# Patient Record
Sex: FEMALE | HISPANIC OR LATINO | URBAN - METROPOLITAN AREA
[De-identification: names, ages, dates, MRNs, and addresses within clinical notes are randomized per-mention and may not be internally consistent; named-entity substitution may affect disease eponyms.]

---
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
## 2024-12-17 ENCOUNTER — OFFICE VISIT (OUTPATIENT)
Dept: URGENT CARE | Facility: CLINIC | Age: 19
End: 2024-12-17

## 2024-12-17 VITALS
BODY MASS INDEX: 38.01 KG/M2 | OXYGEN SATURATION: 97 % | HEART RATE: 120 BPM | TEMPERATURE: 100.3 F | DIASTOLIC BLOOD PRESSURE: 88 MMHG | RESPIRATION RATE: 16 BRPM | SYSTOLIC BLOOD PRESSURE: 128 MMHG | WEIGHT: 250.8 LBS | HEIGHT: 68 IN

## 2024-12-17 DIAGNOSIS — B34.9 VIRAL ILLNESS: ICD-10-CM

## 2024-12-17 DIAGNOSIS — R00.0 TACHYCARDIA: ICD-10-CM

## 2024-12-17 PROCEDURE — 3074F SYST BP LT 130 MM HG: CPT | Performed by: FAMILY MEDICINE

## 2024-12-17 PROCEDURE — 99204 OFFICE O/P NEW MOD 45 MIN: CPT | Performed by: FAMILY MEDICINE

## 2024-12-17 PROCEDURE — 3079F DIAST BP 80-89 MM HG: CPT | Performed by: FAMILY MEDICINE

## 2024-12-17 NOTE — LETTER
December 17, 2024    To Whom It May Concern:         This is confirmation that Evelina Holden attended her scheduled appointment with Janina Haines M.D. on 12/17/24. She may return to work when she's had no fever for greater than 24 hours without the help of medication.         If you have any questions please do not hesitate to call me at the phone number listed below.    Sincerely,          Janina Haines M.D.  [phone]

## 2024-12-18 NOTE — PROGRESS NOTES
"  Subjective:      18 y.o. female presents to urgent care for cold symptoms that started on Sunday. She is experiencing fever, headache, body aches, sore throat, and cough. No diarrhea.  Heart rate is elevated today in urgent care.  She denies any chest pain, palpitations, or shortness of breath.  No tobacco product use. No history of asthma or COPD. She is not vaccinated against COVID. No known sick contacts.     She denies any other questions or concerns at this time.    Current problem list, medication, and past medical/surgical history were reviewed in Epic.    ROS  See HPI     Objective:      /88 (BP Location: Left arm, Patient Position: Sitting, BP Cuff Size: Large adult)   Pulse (!) 120   Temp 37.9 °C (100.3 °F)   Resp 16   Ht 1.727 m (5' 8\")   Wt 114 kg (250 lb 12.8 oz)   SpO2 97%   BMI 38.13 kg/m²     Physical Exam  Constitutional:       General: She is not in acute distress.     Appearance: She is not diaphoretic.   HENT:      Right Ear: Tympanic membrane, ear canal and external ear normal.      Left Ear: Tympanic membrane, ear canal and external ear normal.      Mouth/Throat:      Tongue: Tongue does not deviate from midline.      Palate: No lesions.      Pharynx: Uvula midline. No oropharyngeal exudate or posterior oropharyngeal erythema.      Tonsils: No tonsillar exudate. 1+ on the right. 1+ on the left.   Cardiovascular:      Rate and Rhythm: Regular rhythm. Tachycardia present.      Heart sounds: Normal heart sounds.   Pulmonary:      Effort: Pulmonary effort is normal. No respiratory distress.      Breath sounds: Normal breath sounds.   Neurological:      Mental Status: She is alert.   Psychiatric:         Mood and Affect: Affect normal.         Judgment: Judgment normal.       Assessment/Plan:     1. Viral illness  2. Tachycardia  Systemic symptoms seen through tachycardia.  Symptoms are most consistent with viral illness.  She politely declined testing for COVID, influenza, and RSV.  " She was encouraged to use Tylenol, ibuprofen, rest, and hydration as needed.  Work note has been provided.      Instructed to return to Urgent Care or nearest Emergency Department if symptoms fail to improve, for any change in condition, further concerns, or new concerning symptoms. Patient states understanding of the plan of care and discharge instructions.    Janina Haines M.D.